# Patient Record
Sex: FEMALE | Race: WHITE
[De-identification: names, ages, dates, MRNs, and addresses within clinical notes are randomized per-mention and may not be internally consistent; named-entity substitution may affect disease eponyms.]

---

## 2017-08-15 ENCOUNTER — RX ONLY (RX ONLY)
Age: 62
End: 2017-08-15

## 2017-08-15 RX ORDER — BETAMETHASONE DIPROPIONATE 0.5 MG/G
1 APPLICATION CREAM TOPICAL BID
Qty: 50 | Refills: 3 | Status: DISCONTINUED
Start: 2017-08-15 | End: 2018-04-02

## 2017-10-02 ENCOUNTER — FOLLOW-UP (OUTPATIENT)
Dept: URBAN - METROPOLITAN AREA CLINIC 14 | Facility: CLINIC | Age: 62
Setting detail: DERMATOLOGY
End: 2017-10-02

## 2017-10-02 DIAGNOSIS — L57.0 ACTINIC KERATOSIS: ICD-10-CM

## 2017-10-02 PROCEDURE — 99212 OFFICE O/P EST SF 10 MIN: CPT

## 2018-04-02 ENCOUNTER — RX ONLY (RX ONLY)
Age: 63
End: 2018-04-02

## 2018-04-02 ENCOUNTER — FOLLOW-UP (OUTPATIENT)
Dept: URBAN - METROPOLITAN AREA CLINIC 14 | Facility: CLINIC | Age: 63
Setting detail: DERMATOLOGY
End: 2018-04-02

## 2018-04-02 DIAGNOSIS — L82.0 INFLAMED SEBORRHEIC KERATOSIS: ICD-10-CM

## 2018-04-02 PROBLEM — Z85828 V10.83: Status: ACTIVE | Noted: 2018-04-02

## 2018-04-02 PROCEDURE — 99212 OFFICE O/P EST SF 10 MIN: CPT

## 2018-04-02 RX ORDER — BETAMETHASONE DIPROPIONATE 0.5 MG/G
1 APPLICATION CREAM TOPICAL BID
Qty: 50 | Refills: 3 | Status: DISCONTINUED
Start: 2018-04-02 | End: 2019-10-07

## 2018-06-04 ENCOUNTER — FOLLOW-UP (OUTPATIENT)
Dept: URBAN - METROPOLITAN AREA CLINIC 14 | Facility: CLINIC | Age: 63
Setting detail: DERMATOLOGY
End: 2018-06-04

## 2018-06-04 DIAGNOSIS — C44.622 SQUAMOUS CELL CARCINOMA OF SKIN OF RIGHT UPPER LIMB, INCLUDING SHOULDER: ICD-10-CM

## 2018-06-04 PROCEDURE — 99214 OFFICE O/P EST MOD 30 MIN: CPT

## 2018-06-04 RX ORDER — PREDNISONE 10 MG/1
ADD'L SIG TABLET TABLET ORAL ADD'L SIG
Qty: 42 | Refills: 0 | Status: DISCONTINUED
Start: 2018-06-04 | End: 2018-08-06

## 2018-08-06 ENCOUNTER — FOLLOW-UP (OUTPATIENT)
Dept: URBAN - METROPOLITAN AREA CLINIC 14 | Facility: CLINIC | Age: 63
Setting detail: DERMATOLOGY
End: 2018-08-06

## 2018-08-06 DIAGNOSIS — L57.0 ACTINIC KERATOSIS: ICD-10-CM

## 2018-08-06 DIAGNOSIS — D48.5 NEOPLASM OF UNCERTAIN BEHAVIOR OF SKIN: ICD-10-CM

## 2018-08-06 PROCEDURE — 99212 OFFICE O/P EST SF 10 MIN: CPT

## 2018-08-06 RX ORDER — HALCINONIDE CREAM 1 MG/G
1 APPLICATION CREAM TOPICAL BID
Qty: 60 | Refills: 2 | Status: DISCONTINUED
Start: 2018-08-06 | End: 2019-10-07

## 2018-08-13 ENCOUNTER — OTHER- (OUTPATIENT)
Dept: URBAN - METROPOLITAN AREA CLINIC 14 | Facility: CLINIC | Age: 63
Setting detail: DERMATOLOGY
End: 2018-08-13

## 2018-08-13 DIAGNOSIS — L57.0 ACTINIC KERATOSIS: ICD-10-CM

## 2018-08-13 PROCEDURE — 96372 THER/PROPH/DIAG INJ SC/IM: CPT

## 2018-08-20 ENCOUNTER — OTHER- (OUTPATIENT)
Dept: URBAN - METROPOLITAN AREA CLINIC 14 | Facility: CLINIC | Age: 63
Setting detail: DERMATOLOGY
End: 2018-08-20

## 2018-08-20 PROCEDURE — 96372 THER/PROPH/DIAG INJ SC/IM: CPT

## 2018-08-28 ENCOUNTER — OTHER- (OUTPATIENT)
Dept: URBAN - METROPOLITAN AREA CLINIC 14 | Facility: CLINIC | Age: 63
Setting detail: DERMATOLOGY
End: 2018-08-28

## 2018-08-28 DIAGNOSIS — L80 VITILIGO: ICD-10-CM

## 2018-08-28 DIAGNOSIS — L20.9 ATOPIC DERMATITIS, UNSPECIFIED: ICD-10-CM

## 2018-08-28 PROCEDURE — 96372 THER/PROPH/DIAG INJ SC/IM: CPT

## 2018-09-10 ENCOUNTER — FOLLOW-UP (OUTPATIENT)
Dept: URBAN - METROPOLITAN AREA CLINIC 14 | Facility: CLINIC | Age: 63
Setting detail: DERMATOLOGY
End: 2018-09-10

## 2018-09-10 DIAGNOSIS — L02.212 CUTANEOUS ABSCESS OF BACK [ANY PART, EXCEPT BUTTOCK]: ICD-10-CM

## 2018-09-10 DIAGNOSIS — L72.0 EPIDERMAL CYST: ICD-10-CM

## 2018-09-10 PROCEDURE — 99212 OFFICE O/P EST SF 10 MIN: CPT

## 2019-01-14 ENCOUNTER — RX ONLY (RX ONLY)
Age: 64
End: 2019-01-14

## 2019-01-14 ENCOUNTER — SKIN CHECK (OUTPATIENT)
Dept: URBAN - METROPOLITAN AREA CLINIC 14 | Facility: CLINIC | Age: 64
Setting detail: DERMATOLOGY
End: 2019-01-14

## 2019-01-14 DIAGNOSIS — D18.01 HEMANGIOMA OF SKIN AND SUBCUTANEOUS TISSUE: ICD-10-CM

## 2019-01-14 DIAGNOSIS — L82.1 OTHER SEBORRHEIC KERATOSIS: ICD-10-CM

## 2019-01-14 DIAGNOSIS — L81.4 OTHER MELANIN HYPERPIGMENTATION: ICD-10-CM

## 2019-01-14 DIAGNOSIS — Z85.828 PERSONAL HISTORY OF OTHER MALIGNANT NEOPLASM OF SKIN: ICD-10-CM

## 2019-01-14 DIAGNOSIS — D22.5 MELANOCYTIC NEVI OF TRUNK: ICD-10-CM

## 2019-01-14 PROCEDURE — 99212 OFFICE O/P EST SF 10 MIN: CPT

## 2019-01-14 RX ORDER — PREDNISONE 10 MG/1
1 TABLET TABLET ORAL AS DIRECTED
Qty: 84 | Refills: 0 | Status: DISCONTINUED
Start: 2019-01-14 | End: 2019-10-07

## 2019-01-14 RX ORDER — HALCINONIDE CREAM 1 MG/G
1 APPLICATION CREAM TOPICAL BID
Qty: 60 | Refills: 2 | Status: DISCONTINUED
Start: 2019-01-14 | End: 2019-10-07

## 2019-01-15 ENCOUNTER — RX ONLY (RX ONLY)
Age: 64
End: 2019-01-15

## 2019-01-15 RX ORDER — HALOBETASOL PROPIONATE 0.5 MG/G
1 APPLICATION CREAM TOPICAL BID
Qty: 50 | Refills: 3 | Status: DISCONTINUED
Start: 2019-01-15 | End: 2019-10-17

## 2019-02-25 ENCOUNTER — FOLLOW-UP (OUTPATIENT)
Dept: URBAN - METROPOLITAN AREA CLINIC 14 | Facility: CLINIC | Age: 64
Setting detail: DERMATOLOGY
End: 2019-02-25

## 2019-02-25 DIAGNOSIS — C44.529 SQUAMOUS CELL CARCINOMA OF SKIN OF OTHER PART OF TRUNK: ICD-10-CM

## 2019-02-25 PROCEDURE — 99213 OFFICE O/P EST LOW 20 MIN: CPT

## 2019-05-06 ENCOUNTER — FOLLOW-UP (OUTPATIENT)
Dept: URBAN - METROPOLITAN AREA CLINIC 14 | Facility: CLINIC | Age: 64
Setting detail: DERMATOLOGY
End: 2019-05-06

## 2019-05-06 DIAGNOSIS — D48.5 NEOPLASM OF UNCERTAIN BEHAVIOR OF SKIN: ICD-10-CM

## 2019-05-06 PROCEDURE — 99212 OFFICE O/P EST SF 10 MIN: CPT

## 2019-10-07 ENCOUNTER — OTHER- (OUTPATIENT)
Dept: URBAN - METROPOLITAN AREA CLINIC 14 | Facility: CLINIC | Age: 64
Setting detail: DERMATOLOGY
End: 2019-10-07

## 2019-10-07 DIAGNOSIS — L82.0 INFLAMED SEBORRHEIC KERATOSIS: ICD-10-CM

## 2019-10-07 PROCEDURE — 99213 OFFICE O/P EST LOW 20 MIN: CPT

## 2019-10-17 ENCOUNTER — RX ONLY (RX ONLY)
Age: 64
End: 2019-10-17

## 2019-10-17 RX ORDER — HALOBETASOL PROPIONATE 0.5 MG/G
1 APPLICATION CREAM TOPICAL BID
Qty: 50 | Refills: 3
Start: 2019-10-17

## 2020-11-05 RX ORDER — TRETINOIN 0.5 MG/G
1 APPLICATION CREAM TOPICAL NIGHTLY
Qty: 20 | Refills: 3
Start: 2020-11-05

## 2020-11-05 RX ORDER — TRETINOIN 1 MG/G
1 APPLICATION CREAM TOPICAL NIGHTLY
Qty: 45 | Refills: 3
Start: 2020-11-05

## 2021-05-10 ENCOUNTER — COMPLETE SKIN EXAM (OUTPATIENT)
Dept: URBAN - METROPOLITAN AREA CLINIC 14 | Facility: CLINIC | Age: 66
Setting detail: DERMATOLOGY
End: 2021-05-10

## 2021-05-10 DIAGNOSIS — L40.4 GUTTATE PSORIASIS: ICD-10-CM

## 2021-05-10 PROCEDURE — 99213 OFFICE O/P EST LOW 20 MIN: CPT

## 2021-08-30 RX ORDER — ISOTRETINOIN 24 MG/1
1 CAPSULE CAPSULE ORAL TWICE A DAY
Qty: 60 | Refills: 0
Start: 2021-08-30

## 2021-08-30 RX ORDER — TRIAMCINOLONE ACETONIDE 1 MG/G
1 A SMALL AMOUNT CREAM TOPICAL TWICE A DAY
Qty: 454 | Refills: 0
Start: 2021-08-30

## 2021-09-27 ENCOUNTER — RX ONLY (RX ONLY)
Age: 66
End: 2021-09-27

## 2021-09-27 RX ORDER — HALOBETASOL PROPIONATE 0.5 MG/G
A SMALL AMOUNT CREAM TOPICAL TWICE A DAY
Qty: 50 | Refills: 3
Start: 2021-09-27

## 2023-01-05 ENCOUNTER — APPOINTMENT (OUTPATIENT)
Dept: URBAN - METROPOLITAN AREA CLINIC 210 | Age: 68
Setting detail: DERMATOLOGY
End: 2023-01-11

## 2023-01-05 DIAGNOSIS — L82.1 OTHER SEBORRHEIC KERATOSIS: ICD-10-CM

## 2023-01-05 DIAGNOSIS — D18.0 HEMANGIOMA: ICD-10-CM

## 2023-01-05 DIAGNOSIS — Z12.83 ENCOUNTER FOR SCREENING FOR MALIGNANT NEOPLASM OF SKIN: ICD-10-CM

## 2023-01-05 PROBLEM — D18.01 HEMANGIOMA OF SKIN AND SUBCUTANEOUS TISSUE: Status: ACTIVE | Noted: 2023-01-05

## 2023-01-05 PROCEDURE — OTHER MIPS QUALITY: OTHER

## 2023-01-05 PROCEDURE — OTHER COUNSELING: OTHER

## 2023-01-05 PROCEDURE — 99213 OFFICE O/P EST LOW 20 MIN: CPT

## 2023-01-05 ASSESSMENT — LOCATION DETAILED DESCRIPTION DERM
LOCATION DETAILED: LEFT INFERIOR MEDIAL UPPER BACK
LOCATION DETAILED: RIGHT MID-UPPER BACK
LOCATION DETAILED: LEFT SUPERIOR MEDIAL MIDBACK
LOCATION DETAILED: INFERIOR THORACIC SPINE

## 2023-01-05 ASSESSMENT — LOCATION SIMPLE DESCRIPTION DERM
LOCATION SIMPLE: RIGHT UPPER BACK
LOCATION SIMPLE: LEFT LOWER BACK
LOCATION SIMPLE: LEFT UPPER BACK
LOCATION SIMPLE: UPPER BACK

## 2023-01-05 ASSESSMENT — LOCATION ZONE DERM: LOCATION ZONE: TRUNK

## 2023-01-05 NOTE — PROCEDURE: MIPS QUALITY
Quality 110: Preventive Care And Screening: Influenza Immunization: Influenza Immunization Administered during Influenza season
Quality 111:Pneumonia Vaccination Status For Older Adults: Pneumococcal vaccine (PPSV23) administered on or after patient’s 60th birthday and before the end of the measurement period
Quality 431: Preventive Care And Screening: Unhealthy Alcohol Use - Screening: Patient not identified as an unhealthy alcohol user when screened for unhealthy alcohol use using a systematic screening method
Quality 226: Preventive Care And Screening: Tobacco Use: Screening And Cessation Intervention: Patient screened for tobacco use and is an ex/non-smoker
Quality 130: Documentation Of Current Medications In The Medical Record: Current Medications Documented
Detail Level: Detailed

## 2023-03-10 ENCOUNTER — RX ONLY (RX ONLY)
Age: 68
End: 2023-03-10

## 2023-03-10 RX ORDER — HALOBETASOL PROPIONATE 0.5 MG/G
CREAM TOPICAL
Qty: 15 | Refills: 3 | Status: ERX | COMMUNITY
Start: 2023-03-10

## 2023-08-30 ENCOUNTER — APPOINTMENT (OUTPATIENT)
Dept: URBAN - METROPOLITAN AREA CLINIC 210 | Age: 68
Setting detail: DERMATOLOGY
End: 2023-09-03

## 2023-08-30 DIAGNOSIS — C82.60 CUTANEOUS FOLLICLE CENTER LYMPHOMA, UNSPECIFIED SITE: ICD-10-CM

## 2023-08-30 DIAGNOSIS — Z12.83 ENCOUNTER FOR SCREENING FOR MALIGNANT NEOPLASM OF SKIN: ICD-10-CM

## 2023-08-30 DIAGNOSIS — D22 MELANOCYTIC NEVI: ICD-10-CM

## 2023-08-30 DIAGNOSIS — L81.4 OTHER MELANIN HYPERPIGMENTATION: ICD-10-CM

## 2023-08-30 PROBLEM — D22.4 MELANOCYTIC NEVI OF SCALP AND NECK: Status: ACTIVE | Noted: 2023-08-30

## 2023-08-30 PROCEDURE — OTHER COUNSELING: OTHER

## 2023-08-30 PROCEDURE — 99213 OFFICE O/P EST LOW 20 MIN: CPT

## 2023-08-30 PROCEDURE — OTHER MIPS QUALITY: OTHER

## 2023-08-30 PROCEDURE — OTHER PRESCRIPTION MEDICATION MANAGEMENT: OTHER

## 2023-08-30 PROCEDURE — OTHER ADDITIONAL NOTES: OTHER

## 2023-08-30 ASSESSMENT — BSA RASH: BSA RASH: 1

## 2023-08-30 ASSESSMENT — LOCATION SIMPLE DESCRIPTION DERM
LOCATION SIMPLE: LOWER BACK
LOCATION SIMPLE: RIGHT ANTERIOR NECK

## 2023-08-30 ASSESSMENT — LOCATION DETAILED DESCRIPTION DERM
LOCATION DETAILED: SUPERIOR LUMBAR SPINE
LOCATION DETAILED: RIGHT INFERIOR ANTERIOR NECK

## 2023-08-30 ASSESSMENT — LOCATION ZONE DERM
LOCATION ZONE: TRUNK
LOCATION ZONE: NECK

## 2023-08-30 ASSESSMENT — SEVERITY ASSESSMENT: SEVERITY: ALMOST CLEAR

## 2023-08-30 NOTE — HPI: EVALUATION OF SKIN LESION(S)
What Type Of Note Output Would You Prefer (Optional)?: Bullet Format
Hpi Title: Evaluation of Skin Lesions
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 Pt reports she stopped using halobetasol, reports skin has been tearing easier and bruising more. Reports using halobetasol 3-4x a week to spots. \\nAlso reports experiencing cardytacia and palpitations. Reviewed this with PCP, wore heart monitor and everything came back normal.

## 2023-08-30 NOTE — PROCEDURE: ADDITIONAL NOTES
Render Risk Assessment In Note?: no
Detail Level: Simple
Additional Notes: Secondary to resolving cutaneous lymphoma.
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

## 2023-08-30 NOTE — PROCEDURE: PRESCRIPTION MEDICATION MANAGEMENT
Render In Strict Bullet Format?: No
Discontinue Regimen: Halobetasol
Detail Level: Zone
Plan: Pt should call if cutaneous lymphoma returns.

## 2023-10-30 ENCOUNTER — APPOINTMENT (OUTPATIENT)
Dept: URBAN - METROPOLITAN AREA CLINIC 210 | Age: 68
Setting detail: DERMATOLOGY
End: 2023-10-30

## 2023-10-30 DIAGNOSIS — L90.0 LICHEN SCLEROSUS ET ATROPHICUS: ICD-10-CM

## 2023-10-30 PROCEDURE — OTHER MIPS QUALITY: OTHER

## 2023-10-30 PROCEDURE — OTHER COUNSELING: OTHER

## 2023-10-30 PROCEDURE — OTHER PRESCRIPTION: OTHER

## 2023-10-30 PROCEDURE — 99214 OFFICE O/P EST MOD 30 MIN: CPT

## 2023-10-30 PROCEDURE — OTHER PRESCRIPTION MEDICATION MANAGEMENT: OTHER

## 2023-10-30 RX ORDER — BETAMETHASONE DIPROPIONATE 0.5 MG/G
OINTMENT TOPICAL
Qty: 50 | Refills: 6 | Status: ERX | COMMUNITY
Start: 2023-10-30

## 2023-10-30 ASSESSMENT — LOCATION ZONE DERM
LOCATION ZONE: VULVA
LOCATION ZONE: TRUNK

## 2023-10-30 ASSESSMENT — LOCATION SIMPLE DESCRIPTION DERM
LOCATION SIMPLE: VULVA
LOCATION SIMPLE: PERINEUM

## 2023-10-30 ASSESSMENT — LOCATION DETAILED DESCRIPTION DERM
LOCATION DETAILED: RIGHT LABIA MAJORA
LOCATION DETAILED: PERINEUM
LOCATION DETAILED: LEFT LABIA MAJORA

## 2023-10-30 NOTE — PROCEDURE: PRESCRIPTION MEDICATION MANAGEMENT
Plan: Testosterone 3% mixed with petrolotum called to [facility]. I spoke with Tom the pharmacist. Plan: Testosterone 3% mixed with petrolotum called to [facility]. I spoke with Tom the pharmacist.

## 2023-12-12 ENCOUNTER — APPOINTMENT (OUTPATIENT)
Dept: URBAN - METROPOLITAN AREA CLINIC 210 | Age: 68
Setting detail: DERMATOLOGY
End: 2023-12-17

## 2023-12-12 DIAGNOSIS — C82.60 CUTANEOUS FOLLICLE CENTER LYMPHOMA, UNSPECIFIED SITE: ICD-10-CM

## 2023-12-12 DIAGNOSIS — L90.0 LICHEN SCLEROSUS ET ATROPHICUS: ICD-10-CM

## 2023-12-12 PROCEDURE — OTHER MIPS QUALITY: OTHER

## 2023-12-12 PROCEDURE — 99214 OFFICE O/P EST MOD 30 MIN: CPT

## 2023-12-12 PROCEDURE — OTHER PRESCRIPTION MEDICATION MANAGEMENT: OTHER

## 2023-12-12 PROCEDURE — OTHER COUNSELING: OTHER

## 2023-12-12 ASSESSMENT — LOCATION DETAILED DESCRIPTION DERM
LOCATION DETAILED: RIGHT LABIA MAJORA
LOCATION DETAILED: SUPERIOR LUMBAR SPINE
LOCATION DETAILED: LEFT LABIA MAJORA
LOCATION DETAILED: PERINEUM

## 2023-12-12 ASSESSMENT — LOCATION ZONE DERM
LOCATION ZONE: VULVA
LOCATION ZONE: TRUNK

## 2023-12-12 ASSESSMENT — LOCATION SIMPLE DESCRIPTION DERM
LOCATION SIMPLE: PERINEUM
LOCATION SIMPLE: LOWER BACK
LOCATION SIMPLE: VULVA

## 2023-12-12 NOTE — PROCEDURE: PRESCRIPTION MEDICATION MANAGEMENT
Modify Regimen: Testosterone 3% with petrolotum mixed 50/50 Diprolene (augmented) 0.05 % topical ointment apply to affected area twice daily alternating once daily for 8 weeks, then daily for 4 weeks.
Render In Strict Bullet Format?: No
Detail Level: Zone
Modify Regimen: Apply Halobetasol 2 times daily for 3 weeks, then stop.
Plan: Pt should call if cutaneous lymphoma returns.

## 2024-03-05 ENCOUNTER — APPOINTMENT (OUTPATIENT)
Dept: URBAN - METROPOLITAN AREA CLINIC 210 | Age: 69
Setting detail: DERMATOLOGY
End: 2024-03-05

## 2024-03-05 ENCOUNTER — RX ONLY (RX ONLY)
Age: 69
End: 2024-03-05

## 2024-03-05 DIAGNOSIS — L90.0 LICHEN SCLEROSUS ET ATROPHICUS: ICD-10-CM

## 2024-03-05 DIAGNOSIS — C82.60 CUTANEOUS FOLLICLE CENTER LYMPHOMA, UNSPECIFIED SITE: ICD-10-CM

## 2024-03-05 PROCEDURE — OTHER MIPS QUALITY: OTHER

## 2024-03-05 PROCEDURE — 99214 OFFICE O/P EST MOD 30 MIN: CPT

## 2024-03-05 PROCEDURE — OTHER PRESCRIPTION MEDICATION MANAGEMENT: OTHER

## 2024-03-05 PROCEDURE — OTHER COUNSELING: OTHER

## 2024-03-05 RX ORDER — HALOBETASOL PROPIONATE 0.5 MG/G
CREAM TOPICAL
Qty: 15 | Refills: 3 | Status: ERX

## 2024-03-05 ASSESSMENT — LOCATION DETAILED DESCRIPTION DERM
LOCATION DETAILED: PERINEUM
LOCATION DETAILED: LEFT LABIA MAJORA
LOCATION DETAILED: SUPERIOR LUMBAR SPINE
LOCATION DETAILED: RIGHT LABIA MAJORA

## 2024-03-05 ASSESSMENT — LOCATION ZONE DERM
LOCATION ZONE: TRUNK
LOCATION ZONE: VULVA

## 2024-03-05 ASSESSMENT — LOCATION SIMPLE DESCRIPTION DERM
LOCATION SIMPLE: LOWER BACK
LOCATION SIMPLE: VULVA
LOCATION SIMPLE: PERINEUM

## 2024-03-05 NOTE — PROCEDURE: PRESCRIPTION MEDICATION MANAGEMENT
Modify Regimen: Testosterone 3% with petrolotum mixed 50/50 Diprolene (augmented) 0.05 % topical ointment apply to affected area twice daily as needed for itching
Render In Strict Bullet Format?: No
Plan: If itching does not resolve with treatment, pt to call.  No evidence of scarring visualized today.
Detail Level: Zone
Modify Regimen: Apply Halobetasol 2-3 times daily for 1 week or until spot resolves
Plan: Pt should call if cutaneous lymphoma returns.